# Patient Record
Sex: FEMALE | Employment: FULL TIME | URBAN - METROPOLITAN AREA
[De-identification: names, ages, dates, MRNs, and addresses within clinical notes are randomized per-mention and may not be internally consistent; named-entity substitution may affect disease eponyms.]

---

## 2017-01-03 ENCOUNTER — OFFICE VISIT (OUTPATIENT)
Dept: FAMILY MEDICINE CLINIC | Age: 62
End: 2017-01-03

## 2017-01-03 VITALS
OXYGEN SATURATION: 96 % | RESPIRATION RATE: 18 BRPM | HEART RATE: 64 BPM | WEIGHT: 178 LBS | TEMPERATURE: 95.8 F | SYSTOLIC BLOOD PRESSURE: 160 MMHG | HEIGHT: 59 IN | BODY MASS INDEX: 35.88 KG/M2 | DIASTOLIC BLOOD PRESSURE: 100 MMHG

## 2017-01-03 DIAGNOSIS — L84 CORN OF FOOT: ICD-10-CM

## 2017-01-03 DIAGNOSIS — I10 ESSENTIAL HYPERTENSION: Primary | ICD-10-CM

## 2017-01-03 RX ORDER — AMLODIPINE BESYLATE 5 MG/1
5 TABLET ORAL DAILY
Qty: 30 TAB | Refills: 0 | Status: SHIPPED | OUTPATIENT
Start: 2017-01-03 | End: 2017-03-27 | Stop reason: SINTOL

## 2017-01-03 NOTE — PROGRESS NOTES
Chief Complaint   Patient presents with    Follow Up Chronic Condition     hypertension    Foot Pain     pt states she thinks that she stepped on a rock, left foot

## 2017-01-03 NOTE — MR AVS SNAPSHOT
Visit Information Date & Time Provider Department Dept. Phone Encounter #  
 1/3/2017  9:30 AM Bette Casas NP [facility] [address] [phone] [id number] Follow-up Instructions Return in about 1 week (around 1/10/2017) for nurse BP check and 3 months for chronic disease follow up . Your Appointments 1/18/2017  8:30 AM  
COLON SCREEN with TSS [facility]IT [facility] Appt Note: NP CN Screen / Dr Bette Casas [address] [address] [address] [address]  
[facility] [address] [facility] [address] [address] [address] [address] Upcoming Health Maintenance Date Due DTaP/Tdap/Td series (1 - Tdap) 4/13/1978 COLONOSCOPY 7/8/2014 BREAST CANCER SCRN MAMMOGRAM 12/8/2018 Allergies as of 1/3/2017  Review Complete On: 1/3/2017 By: Bette Casas NP Severity Noted Reaction Type Reactions Latex High 04/10/2015    Itching Penicillins  04/10/2015    Unknown (comments) As a child Current Immunizations  Never Reviewed No immunizations on file. Not reviewed this visit You Were Diagnosed With   
  
 Codes Comments Essential hypertension    -  Primary ICD-10-CM: I10 
ICD-9-CM: 401.9 Corn of foot     ICD-10-CM: L84 
ICD-9-CM: 033 Vitals BP Pulse Temp Resp Height(growth percentile) Weight(growth percentile) 153/73 (BP 1 Location: Left arm, BP Patient Position: Sitting) 64 95.8 °F (35.4 °C) (Oral) 18 4' 11\" (1.499 m) 178 lb (80.7 kg) SpO2 BMI OB Status Smoking Status 96% 35.95 kg/m2 Hysterectomy Never Smoker Vitals History BMI and BSA Data Body Mass Index Body Surface Area 35.95 kg/m 2 1.83 m 2 Preferred Pharmacy Pharmacy Name Phone [address], [address] [address] [facility] [phone] Your Updated Medication List  
  
   
 This list is accurate as of: 1/3/17 10:20 AM.  Always use your most recent med list. amLODIPine 5 mg tablet Commonly known as:  Angeles Brandt Take 1 Tab by mouth daily. multivitamin tablet Commonly known as:  ONE A DAY Take 1 Tab by mouth daily. Prescriptions Sent to Pharmacy Refills  
 amLODIPine (NORVASC) 5 mg tablet 0 Sig: Take 1 Tab by mouth daily. Class: Normal  
 Pharmacy: [address] [place] [address] #: [phone] Route: Oral  
  
Follow-up Instructions Return in about 1 week (around 1/10/2017) for nurse BP check and 3 months for chronic disease follow up . Referral Information Referral ID Referred By Referred To  
  
 [id number] Charbel GARZA Not Available Visits Status Start Date End Date 1 New Request 1/3/17 1/3/18 If your referral has a status of pending review or denied, additional information will be sent to support the outcome of this decision. Introducing [facility] & HEALTH SERVICES! [facility] introduces [employer] patient portal. Now you can access parts of your medical record, email your doctor's office, and request medication refills online. 1. In your internet browser, go to [email or web address] 2. Click on the First Time User? Click Here link in the Sign In box. You will see the New Member Sign Up page. 3. Enter your [employer] Access Code exactly as it appears below. You will not need to use this code after youve completed the sign-up process. If you do not sign up before the expiration date, you must request a new code. · [employer] Access Code: [email or web address] Expires: 3/5/2017 10:42 AM 
 
4. Enter the last four digits of your Social Security Number (xxxx) and Date of Birth (mm/dd/yyyy) as indicated and click Submit. You will be taken to the next sign-up page. 5. Create a [employer] ID.  This will be your [employer] login ID and cannot be changed, so think of one that is secure and easy to remember. 6. Create a Digicompanion password. You can change your password at any time. 7. Enter your Password Reset Question and Answer. This can be used at a later time if you forget your password. 8. Enter your e-mail address. You will receive e-mail notification when new information is available in [address] 9. Click Sign Up. You can now view and download portions of your medical record. 10. Click the Download Summary menu link to download a portable copy of your medical information. If you have questions, please visit the Frequently Asked Questions section of the [employer] website. Remember, [employer] is NOT to be used for urgent needs. For medical emergencies, dial 911. Now available from your iPhone and Android! Please provide this summary of care documentation to your next provider. Your primary care clinician is listed as Nicole Dos Santos. If you have any questions after today's visit, please call [phone].

## 2017-01-03 NOTE — PROGRESS NOTES
HISTORY OF PRESENT ILLNESS  Amalia Soto is a 63 y.o. female. 1/3/2017  10:05 AM    Chief Complaint   Patient presents with    Follow Up Chronic Condition     hypertension    Foot Pain     pt states she thinks that she stepped on a rock, left foot       HPI: Here today for follow up of chronic disease- Last labwork done 2015- did not have labs done yet that were previously ordered Does not follow with any other providers. Hypertension: Does not check BP at home, used to be on Lisinopril but when she ran out, she did not think she needed it anymore- has been off for about a year. Denies any complaints of headaches, dizziness, cough, chest pain, SOB, or leg/facial swelling. Complains of left foot pain that has been happening for a few days, she reports that she thinks she stepped on a rock and had thin shoes on causing the pain. Denies any open areas or foreign bodies in foot. She reports pain comes with pressing on the area. Not taking or doing anything to help.        Review of Systems   Constitutional: Negative for chills, fever and malaise/fatigue. Eyes: Negative for double vision, photophobia and pain. Respiratory: Negative for cough and wheezing. Cardiovascular: Negative for palpitations and leg swelling. Gastrointestinal: Negative for constipation, diarrhea, nausea and vomiting. Genitourinary: Negative for dysuria, frequency and urgency. Musculoskeletal:        +left foot pain   Neurological: Negative for dizziness and weakness.         PHQ Screening   PHQ 2 / 9, over the last two weeks 4/10/2015   Little interest or pleasure in doing things Not at all   Feeling down, depressed or hopeless Not at all   Total Score PHQ 2 0         History  Past Medical History   Diagnosis Date    Hypertension        Past Surgical History   Procedure Laterality Date    Hx hysterectomy       fibroids    Hx  section  0    Hx  section         Social History     Social History    Marital status: UNKNOWN     Spouse name: N/A    Number of children: N/A    Years of education: N/A     Occupational History    Not on file. Social History Main Topics    Smoking status: Never Smoker    Smokeless tobacco: Never Used    Alcohol use No    Drug use: No    Sexual activity: Not Currently     Other Topics Concern    Not on file     Social History Narrative       Family History   Problem Relation Age of Onset    Cancer Father      lung- smoker    Lung Disease Father      smoker    Cancer Sister 45     breast    Breast Cancer Sister     Gout Brother        Allergies   Allergen Reactions    Latex Itching    Penicillins Unknown (comments)     As a child       Current Outpatient Prescriptions   Medication Sig Dispense Refill    multivitamin (ONE A DAY) tablet Take 1 Tab by mouth daily. Health Maintenance and Screenings  Colon Cancer: Repeat due in 2014- had polyp in 2009- referral placed for repeat previously  COPD Screen/ Lung Cancer: Not indicated- nonsmoker  CAD Screen: , HDL 60 5/2015, lifestyle changes, recheck ordered previously  Diabetes: Glucose 77 5/2015, recheck ordered  STD and HIV Screen: Declines- not high risk  Breast Cancer: Mammo done 12/2016- BIRADS 2  Cervical Cancer: Hysterectomy in past due to fibroids- not indicated  Osteoporosis Screen: Not indicated for early screening  Abdominal Aneurysm Screen: Not indicated  Opthalmology: Follows with eye doctor- seen last about one year ago  Dentistry Services: Follows with dentist  Hearing Impairment: No hearing loss noted  Skin Cancer Screen: Discussed self checks  Obesity Screen:  Body mass index is 35.95 kg/(m^2).     Flu Vaccination: Declines  Pneumococcal Vaccine: Not indicated for early vaccination  Shingles Vaccine: Not indicated for early vaccination  Tetanus Booster: Unsure of last booster- recommended  Hepatitis B Vaccinations: Not indicated- not high risk      Advance Care Planning:   Patient was offered the opportunity to discuss advance care planning NO   Does patient have an Advance Directive:  NO   If no, did you provide information on Caring Connections? Patient Care Team:  Patient Care Team:  Keenan Orellana NP as PCP - General        LABS:  None new to review    RADIOLOGY:  None new to review      Physical Exam   Constitutional: She is oriented to person, place, and time. She appears well-developed and well-nourished. No distress. Pulmonary/Chest: Effort normal. No respiratory distress. Musculoskeletal: She exhibits no edema. Left foot: There is tenderness. There is normal range of motion, no swelling, normal capillary refill, no deformity and no laceration. -left plantar side of foot with 4 mm x 4 mm callused, corn like area; no drainage and no erythema   Neurological: She is alert and oriented to person, place, and time. She exhibits normal muscle tone. Coordination normal.   Skin: Skin is warm and dry. Vitals:    01/03/17 0949   BP: 153/73   Pulse: 64   Resp: 18   Temp: 95.8 °F (35.4 °C)   TempSrc: Oral   SpO2: 96%   Weight: 178 lb (80.7 kg)   Height: 4' 11\" (1.499 m)   PainSc:   7   PainLoc: Foot     BP Readings from Last 3 Encounters:   01/03/17 153/73   12/02/16 160/90   07/07/15 139/77       ASSESSMENT and PLAN  Sandy Dawn was seen today for physical.    Diagnoses and all orders for this visit:      Essential hypertension  *Discussed with patient. Recommended pharmacological therapy to management HTN. Discussed long term effects of uncontrolled BP. She is hesitant to try but willing to do BP medication. Will start on Norvasc. Follow up in 1-2 weeks for nurse BP check. - amLODIPine (NORVASC) 5 mg tablet; Take 1 Tab by mouth daily. Dispense: 30 Tab; Refill: 0    Corn of foot  *Discussed. Appears to be a corn. Recommend referral to podiatry.   - [address] [place] [address] reviewed with patient.  Patient in agreement with plan and expresses understanding. All questions answered and patient encouraged to call or RTO if further questions or concerns. Follow-up Disposition:  Return in about 1 week (around 1/10/2017) for nurse BP check and 3 months for chronic disease follow up .

## 2017-01-18 ENCOUNTER — OFFICE VISIT (OUTPATIENT)
Dept: SURGERY | Age: 62
End: 2017-01-18

## 2017-01-18 VITALS
WEIGHT: 174.16 LBS | BODY MASS INDEX: 35.11 KG/M2 | HEIGHT: 59 IN | HEART RATE: 69 BPM | TEMPERATURE: 98.2 F | OXYGEN SATURATION: 99 % | RESPIRATION RATE: 16 BRPM

## 2017-01-18 DIAGNOSIS — Z12.11 COLON CANCER SCREENING: Primary | ICD-10-CM

## 2017-01-18 NOTE — PROGRESS NOTES
Review of Systems   Constitutional: Negative. HENT: Negative. Eyes: Positive for blurred vision. Negative for double vision, photophobia, pain, discharge and redness. Respiratory: Negative. Cardiovascular: Positive for palpitations. Negative for chest pain, orthopnea, claudication, leg swelling and PND. Gastrointestinal: Negative. Genitourinary: Negative. Musculoskeletal: Positive for neck pain. Negative for back pain, falls, joint pain and myalgias. Skin: Positive for itching and rash. Groin occas maybe due to detergent   Neurological: Negative. Endo/Heme/Allergies: Negative. Psychiatric/Behavioral: Negative. Colon Screen    Patient: Jalyn Pemberton MRN: [id number]  SSN: [id number]    YOB: 1955  Age: 63 y.o. Sex: female        Subjective:   Jalyn Pemberton was referred by her PCP, Oxana Mckeon NP. Patient referred for colonoscopy for   Screening colonoscopy. Patient denies rectal pain or bleeding. Abdominal surgeries as described below, specifically 2  sections and partial hysterectomy. Family history as described below, specifically sister with colon polyps. Last colonoscopy was 5 years ago at [facility], records release signed and faxed.     Allergies   Allergen Reactions    Latex Itching    Penicillins Unknown (comments)     As a child       Past Medical History   Diagnosis Date    Family hx colonic polyps      sister    Hypertension      Past Surgical History   Procedure Laterality Date    Hx hysterectomy       fibroids    Hx  section  0    Hx  section      Hx colonoscopy       normal      Family History   Problem Relation Age of Onset    Cancer Father      lung- smoker    Lung Disease Father      smoker    Cancer Sister 45     breast    Breast Cancer Sister     Gout Brother      Social History   Substance Use Topics    Smoking status: Never Smoker    Smokeless tobacco: Never Used    Alcohol use No      Prior to Admission medications    Medication Sig Start Date End Date Taking? Authorizing Provider   ibuprofen (ADVIL) 100 mg tablet Take 100 mg by mouth every six (6) hours as needed for Pain. Yes Historical Provider   multivitamin (ONE A DAY) tablet Take 1 Tab by mouth daily. Yes Historical Provider   amLODIPine (NORVASC) 5 mg tablet Take 1 Tab by mouth daily. 1/3/17   Marty Shukla NP          Review of Systems:      Risks colonoscopy described- colon injury, missed lesion, anesthesia problems, bleeding       Becky Colon, LPN  January 18, 6569  9:13 AM

## 2017-01-18 NOTE — MR AVS SNAPSHOT
Visit Information Date & Time Provider Department Dept. Phone Encounter #  
 1/18/2017  8:30 AM TSS HBV NURSE VISIT Tammy Ashutosh [facility] [phone] [id number] Upcoming Health Maintenance Date Due DTaP/Tdap/Td series (1 - Tdap) 4/13/1978 COLONOSCOPY 7/8/2014 BREAST CANCER SCRN MAMMOGRAM 12/8/2018 Allergies as of 1/18/2017  Review Complete On: 1/18/2017 By: Aj Lancaster. Early, LPN Severity Noted Reaction Type Reactions Latex High 04/10/2015    Itching Penicillins  04/10/2015    Unknown (comments) As a child Current Immunizations  Never Reviewed No immunizations on file. Not reviewed this visit Vitals Pulse Temp Resp Height(growth percentile) Weight(growth percentile) SpO2  
 69 98.2 °F (36.8 °C) (Oral) 16 4' 11\" (1.499 m) 174 lb 2.6 oz (79 kg) 99% BMI OB Status Smoking Status 35.18 kg/m2 Hysterectomy Never Smoker Vitals History BMI and BSA Data Body Mass Index Body Surface Area  
 35.18 kg/m 2 1.81 m 2 Preferred Pharmacy Pharmacy Name Phone [address], [address] [place] [address] [address] [facility] [phone] Your Updated Medication List  
  
   
This list is accurate as of: 1/18/17  9:12 AM.  Always use your most recent med list. ADVIL 100 mg tablet Generic drug:  ibuprofen Take 100 mg by mouth every six (6) hours as needed for Pain. amLODIPine 5 mg tablet Commonly known as:  Nadira Spruce Take 1 Tab by mouth daily. multivitamin tablet Commonly known as:  ONE A DAY Take 1 Tab by mouth daily. Introducing [facility] & HEALTH SERVICES! Tammy Boykin introduces [employer] patient portal. Now you can access parts of your medical record, email your doctor's office, and request medication refills online. 1. In your internet browser, go to [email or web address] 2. Click on the First Time User? Click Here link in the Sign In box. You will see the New Member Sign Up page. 3. Enter your [employer] Access Code exactly as it appears below. You will not need to use this code after youve completed the sign-up process. If you do not sign up before the expiration date, you must request a new code. · [employer] Access Code: [email or web address] Expires: 3/5/2017 10:42 AM 
 
4. Enter the last four digits of your Social Security Number (xxxx) and Date of Birth (mm/dd/yyyy) as indicated and click Submit. You will be taken to the next sign-up page. 5. Create a [employer] ID. This will be your [employer] login ID and cannot be changed, so think of one that is secure and easy to remember. 6. Create a [employer] password. You can change your password at any time. 7. Enter your Password Reset Question and Answer. This can be used at a later time if you forget your password. 8. Enter your e-mail address. You will receive e-mail notification when new information is available in [address] 9. Click Sign Up. You can now view and download portions of your medical record. 10. Click the Download Summary menu link to download a portable copy of your medical information. If you have questions, please visit the Frequently Asked Questions section of the [employer] website. Remember, [employer] is NOT to be used for urgent needs. For medical emergencies, dial 911. Now available from your iPhone and Android! Please provide this summary of care documentation to your next provider. Your primary care clinician is listed as Sun Kay. If you have any questions after today's visit, please call [phone].

## 2017-03-27 ENCOUNTER — OFFICE VISIT (OUTPATIENT)
Dept: FAMILY MEDICINE CLINIC | Age: 62
End: 2017-03-27

## 2017-03-27 VITALS
SYSTOLIC BLOOD PRESSURE: 169 MMHG | TEMPERATURE: 98.3 F | BODY MASS INDEX: 35.92 KG/M2 | WEIGHT: 178.2 LBS | HEIGHT: 59 IN | OXYGEN SATURATION: 98 % | RESPIRATION RATE: 18 BRPM | DIASTOLIC BLOOD PRESSURE: 89 MMHG | HEART RATE: 69 BPM

## 2017-03-27 DIAGNOSIS — R21 RASH: Primary | ICD-10-CM

## 2017-03-27 DIAGNOSIS — I10 ESSENTIAL HYPERTENSION: ICD-10-CM

## 2017-03-27 RX ORDER — TRIAMCINOLONE ACETONIDE 5 MG/G
CREAM TOPICAL 2 TIMES DAILY
Qty: 15 G | Refills: 0 | Status: SHIPPED | OUTPATIENT
Start: 2017-03-27 | End: 2017-06-14

## 2017-03-27 RX ORDER — LISINOPRIL 10 MG/1
10 TABLET ORAL DAILY
Qty: 30 TAB | Refills: 0 | Status: SHIPPED | OUTPATIENT
Start: 2017-03-27 | End: 2017-06-14 | Stop reason: SDUPTHER

## 2017-03-27 NOTE — MR AVS SNAPSHOT
Visit Information Date & Time Provider Department Dept. Phone Encounter #  
 3/27/2017  9:15 AM Silvana Mcmullen, [address] [facility] [address] [id number] Your Appointments 4/25/2017  9:45 AM  
FOLLOW UP EXAM with Danisha Jimenez NP [facility] ([facility]) Appt Note: 2 week f/u  
 [address] [facility] [address]  
[facility] [address] Upcoming Health Maintenance Date Due DTaP/Tdap/Td series (1 - Tdap) 4/13/1978 COLONOSCOPY 7/8/2014 BREAST CANCER SCRN MAMMOGRAM 12/8/2018 Allergies as of 3/27/2017  Review Complete On: 3/27/2017 By: CAROLINA Rios Severity Noted Reaction Type Reactions Latex High 04/10/2015    Itching Penicillins  04/10/2015    Unknown (comments) As a child Current Immunizations  Never Reviewed No immunizations on file. Not reviewed this visit You Were Diagnosed With   
  
 Codes Comments Rash    -  Primary ICD-10-CM: R21 
ICD-9-CM: 782.1 Essential hypertension     ICD-10-CM: I10 
ICD-9-CM: 401.9 Vitals BP Pulse Temp Resp Height(growth percentile) Weight(growth percentile) 169/89 (BP 1 Location: Right arm, BP Patient Position: Sitting) 69 98.3 °F (36.8 °C) (Oral) 18 4' 11\" (1.499 m) 178 lb 3.2 oz (80.8 kg) SpO2 BMI OB Status Smoking Status 98% 35.99 kg/m2 Hysterectomy Never Smoker Vitals History BMI and BSA Data Body Mass Index Body Surface Area 35.99 kg/m 2 1.83 m 2 Preferred Pharmacy Pharmacy Name Phone [address], [address] [facility] 22 [address] [facility] [phone] Your Updated Medication List  
  
   
This list is accurate as of: 3/27/17  9:42 AM.  Always use your most recent med list. ADVIL 100 mg tablet Generic drug:  ibuprofen Take 100 mg by mouth every six (6) hours as needed for Pain. amLODIPine 5 mg tablet Commonly known as:  Rudene Post Take 1 Tab by mouth daily. lisinopril 10 mg tablet Commonly known as:  Delsie Commander Take 1 Tab by mouth daily. multivitamin tablet Commonly known as:  ONE A DAY Take 1 Tab by mouth daily. triamcinolone 0.5 % topical cream  
Commonly known as:  ARISTOCORT Apply  to affected area two (2) times a day. use thin layer Prescriptions Sent to Pharmacy Refills  
 triamcinolone (ARISTOCORT) 0.5 % topical cream 0 Sig: Apply  to affected area two (2) times a day. use thin layer Class: Normal  
 Pharmacy: [address] [place] [address] Ph #: [phone] Route: Topical  
 lisinopril (PRINIVIL, ZESTRIL) 10 mg tablet 0 Sig: Take 1 Tab by mouth daily. Class: Normal  
 Pharmacy: [address] [place] [address] Ph #: [phone] Route: Oral  
  
Introducing [facility] & HEALTH SERVICES! [facility] introduces [employer] patient portal. Now you can access parts of your medical record, email your doctor's office, and request medication refills online. 1. In your internet browser, go to [email or web address] 2. Click on the First Time User? Click Here link in the Sign In box. You will see the New Member Sign Up page. 3. Enter your Lala Access Code exactly as it appears below. You will not need to use this code after youve completed the sign-up process. If you do not sign up before the expiration date, you must request a new code. · [employer]ala Access Code: [email or web address] Expires: 6/25/2017  9:42 AM 
 
4. Enter the last four digits of your Social Security Number (xxxx) and Date of Birth (mm/dd/yyyy) as indicated and click Submit. You will be taken to the next sign-up page. 5. Create a [employer] ID. This will be your [employer] login ID and cannot be changed, so think of one that is secure and easy to remember. 6. Create a [employer] password. You can change your password at any time. 7. Enter your Password Reset Question and Answer. This can be used at a later time if you forget your password. 8. Enter your e-mail address. You will receive e-mail notification when new information is available in [address] 9. Click Sign Up. You can now view and download portions of your medical record. 10. Click the Download Summary menu link to download a portable copy of your medical information. If you have questions, please visit the Frequently Asked Questions section of the EnergyDe[employer] website. Remember, [employer] is NOT to be used for urgent needs. For medical emergencies, dial 911. Now available from your iPhone and Android! Please provide this summary of care documentation to your next provider. Your primary care clinician is listed as Juany Davis. If you have any questions after today's visit, please call [phone].

## 2017-03-27 NOTE — PROGRESS NOTES
Patient: Claudia Hollingsworth MRN: [id number]  SSN: [id number]    YOB: 1955  Age: 63 y.o. Sex: female      Date of Service: 3/27/2017   Provider: CAROLINA Frederick   Office Location:   [address] [facility], [address] [place] [address], [address]  Office Phone: [phone]  Office Fax: [phone]        REASON FOR VISIT:   Chief Complaint   Patient presents with    Medication Reaction     amlodipine, red raised rash on chest        VITALS:   Visit Vitals    /89 (BP 1 Location: Right arm, BP Patient Position: Sitting)    Pulse 69    Temp 98.3 °F (36.8 °C) (Oral)    Resp 18    Ht 4' 11\" (1.499 m)    Wt 178 lb 3.2 oz (80.8 kg)    SpO2 98%    BMI 35.99 kg/m2       MEDICATIONS:   Current Outpatient Prescriptions   Medication Sig Dispense Refill    triamcinolone (ARISTOCORT) 0.5 % topical cream Apply  to affected area two (2) times a day. use thin layer 15 g 0    lisinopril (PRINIVIL, ZESTRIL) 10 mg tablet Take 1 Tab by mouth daily. 30 Tab 0    ibuprofen (ADVIL) 100 mg tablet Take 100 mg by mouth every six (6) hours as needed for Pain.  multivitamin (ONE A DAY) tablet Take 1 Tab by mouth daily.  amLODIPine (NORVASC) 5 mg tablet Take 1 Tab by mouth daily.  30 Tab 0        ALLERGIES:   Allergies   Allergen Reactions    Latex Itching    Penicillins Unknown (comments)     As a child        ACTIVE MEDICAL PROBLEMS:  Patient Active Problem List   Diagnosis Code    Essential hypertension I10        MEDICAL/SURGICAL HISTORY:  Past Medical History:   Diagnosis Date    Family hx colonic polyps     sister    Hypertension       Past Surgical History:   Procedure Laterality Date    HX  SECTION  0    HX  SECTION      HX COLONOSCOPY  2012    normal    HX HYSTERECTOMY  1997    fibroids        FAMILY HISTORY:  Family History   Problem Relation Age of Onset    Cancer Father      lung- smoker    Lung Disease Father      smoker    Cancer Sister 45     breast    Breast Cancer Sister     Gout Brother         SOCIAL HISTORY:  Social History   Substance Use Topics    Smoking status: Never Smoker    Smokeless tobacco: Never Used    Alcohol use No            HISTORY OF PRESENT ILLNESS:   Radha Nam is a 63 y.o. female who presents to the office complaining of a red rash on her chest x 3 days. Patient reports she was prescribed amlodipine a few months ago, but only started the medication on Friday night 3/24. The following morning, Saturday 3/25, patient woke up with some itching on her chest and anterior R shoulder. Later in the day, she looked at her skin, and noticed a \"red bumpy\" rash on her chest. She applied some alcohol and neosporin to the rash with no relief. Then tried a dose of Benadryl, which seems to have reduced the redness somewhat. However, pruritic rash still persists. She has not taken any more of the amlodipine, as she presumes this rash was an allergic reaction to the medication. She reports feeling well otherwise. No fevers, chills, swelling of lips or throat. Cannot think of any other new exposures to new foods, pets, skin products, laundry detergents. Patient denies any contacts with similar rash. REVIEW OF SYSTEMS:  Review of Systems   Constitutional: Negative for chills and fever. Respiratory: Negative for cough, shortness of breath and wheezing. Cardiovascular: Negative for chest pain and palpitations. Gastrointestinal: Negative for abdominal pain, diarrhea, nausea and vomiting. Skin: Positive for itching and rash. PHYSICAL EXAMINATION:  Physical Exam   Constitutional: She is well-developed, well-nourished, and in no distress. Cardiovascular: Normal rate, regular rhythm and normal heart sounds. Exam reveals no gallop and no friction rub. No murmur heard. Skin: Skin is warm and dry.  Rash ( erythematous wheals with evidence of excoriation to chest wall above breasts and R anterior shoulder) noted.        RESULTS:  No results found for this visit on 03/27/17. ASSESSMENT/PLAN:  Codey Bates was seen today for medication reaction. Diagnoses and all orders for this visit:    Rash  - Etiology unclear, possible allergic reaction to amlodipine, though discussed with patient that because she has discontinued the medication, I would have expected the rash to improve by now  - Encouraged continued use of antihistamines and start topical steroid as below  - Will update allergy list for now, but still think it may be reasonable to try resuming amlodipine at some point if necessary  -     triamcinolone (ARISTOCORT) 0.5 % topical cream; Apply  to affected area two (2) times a day. use thin layer    Essential hypertension  - BP still elevated, reviewed need for antihypertensive therapy with patient  - Will start lisinopril as below  - Advised to schedule follow up with PCP Agnes Packer in about 2 weeks   -     lisinopril (PRINIVIL, ZESTRIL) 10 mg tablet; Take 1 Tab by mouth daily. Follow up 2 weeks  sooner PRN if symptoms worsen or fail to resolve    Patient expresses understanding and is agreeable with the above plan.       PATIENT CARE TEAM:   Patient Care Team:  Agnes Packer NP as PCP - [facility], [address]   March 27, 2017    9:47 AM

## 2017-06-14 ENCOUNTER — OFFICE VISIT (OUTPATIENT)
Dept: FAMILY MEDICINE CLINIC | Age: 62
End: 2017-06-14

## 2017-06-14 VITALS
WEIGHT: 167.4 LBS | SYSTOLIC BLOOD PRESSURE: 101 MMHG | OXYGEN SATURATION: 98 % | BODY MASS INDEX: 33.75 KG/M2 | DIASTOLIC BLOOD PRESSURE: 57 MMHG | HEART RATE: 90 BPM | TEMPERATURE: 96.8 F | RESPIRATION RATE: 18 BRPM | HEIGHT: 59 IN

## 2017-06-14 DIAGNOSIS — Z98.890 S/P CRANIOTOMY: ICD-10-CM

## 2017-06-14 DIAGNOSIS — Z93.1 S/P PERCUTANEOUS ENDOSCOPIC GASTROSTOMY (PEG) TUBE PLACEMENT (HCC): ICD-10-CM

## 2017-06-14 DIAGNOSIS — Z98.890 H/O TRACHEOSTOMY: ICD-10-CM

## 2017-06-14 DIAGNOSIS — I60.9 SAH (SUBARACHNOID HEMORRHAGE) (HCC): ICD-10-CM

## 2017-06-14 DIAGNOSIS — S02.91XD CLOSED FRACTURE OF SKULL WITH ROUTINE HEALING, UNSPECIFIED BONE, SUBSEQUENT ENCOUNTER: ICD-10-CM

## 2017-06-14 DIAGNOSIS — S06.5XAA SDH (SUBDURAL HEMATOMA): ICD-10-CM

## 2017-06-14 DIAGNOSIS — I10 ESSENTIAL HYPERTENSION: ICD-10-CM

## 2017-06-14 DIAGNOSIS — S06.9X9D TBI (TRAUMATIC BRAIN INJURY), WITH LOC OF UNSPECIFIED DURATION, SUBSEQUENT ENCOUNTER: Primary | ICD-10-CM

## 2017-06-14 DIAGNOSIS — Z29.9 PROPHYLACTIC MEASURE: ICD-10-CM

## 2017-06-14 DIAGNOSIS — S02.92XD CLOSED FRACTURE OF FACIAL BONE WITH ROUTINE HEALING, UNSPECIFIED FACIAL BONE, SUBSEQUENT ENCOUNTER: ICD-10-CM

## 2017-06-14 RX ORDER — LISINOPRIL 20 MG/1
20 TABLET ORAL DAILY
Qty: 30 TAB | Refills: 2 | Status: SHIPPED | OUTPATIENT
Start: 2017-06-14 | End: 2018-11-16 | Stop reason: SDDI

## 2017-06-14 RX ORDER — AMLODIPINE BESYLATE 10 MG/1
10 TABLET ORAL DAILY
Qty: 30 TAB | Refills: 2 | Status: SHIPPED | OUTPATIENT
Start: 2017-06-14 | End: 2018-11-16

## 2017-06-14 RX ORDER — FAMOTIDINE 20 MG/1
20 TABLET, FILM COATED ORAL 2 TIMES DAILY
Qty: 60 TAB | Refills: 2 | Status: SHIPPED | OUTPATIENT
Start: 2017-06-14 | End: 2017-06-22 | Stop reason: CLARIF

## 2017-06-14 NOTE — MR AVS SNAPSHOT
Visit Information Date & Time Provider Department Dept. Phone Encounter #  
 6/14/2017  1:30 PM Wendy Grimaldo [facility] [phone] [id number] Follow-up Instructions Return in about 3 months (around 8/30/2017). Your Appointments 8/30/2017  1:45 PM  
FOLLOW UP EXAM with Wendy Grimaldo NP [facility] ([facility]) Appt Note: 2 month f/u 30 min [address] [facility] [id number]  
[facility] [id number] Upcoming Health Maintenance Date Due DTaP/Tdap/Td series (1 - Tdap) 4/13/1978 COLONOSCOPY 7/8/2014 ZOSTER VACCINE AGE 60> 4/13/2017 INFLUENZA AGE 9 TO ADULT 8/1/2017 BREAST CANCER SCRN MAMMOGRAM 12/8/2018 Allergies as of 6/14/2017  Review Complete On: 6/14/2017 By: Wendy Grimaldo NP Severity Noted Reaction Type Reactions Latex High 04/10/2015    Itching Penicillins  04/10/2015    Unknown (comments) As a child Current Immunizations  Never Reviewed No immunizations on file. Not reviewed this visit You Were Diagnosed With   
  
 Codes Comments TBI (traumatic brain injury), with LOC of unspecified duration, subsequent encounter    -  Primary ICD-10-CM: A95.7U0E 
ICD-9-CM: V58.89 Essential hypertension     ICD-10-CM: I10 
ICD-9-CM: 401.9 Vitals BP Pulse Temp Resp Height(growth percentile) Weight(growth percentile) 101/57 (BP 1 Location: Right arm, BP Patient Position: Sitting) 90 96.8 °F (36 °C) (Oral) 18 4' 11\" (1.499 m) 167 lb 6.4 oz (75.9 kg) SpO2 BMI OB Status Smoking Status 98% 33.81 kg/m2 Hysterectomy Never Smoker Vitals History BMI and BSA Data Body Mass Index Body Surface Area  
 33.81 kg/m 2 1.78 m 2 Preferred Pharmacy Pharmacy Name Phone [address], [address] [facility] [phone] Your Updated Medication List  
  
   
This list is accurate as of: 6/14/17  2:29 PM.  Always use your most recent med list. amLODIPine 10 mg tablet Commonly known as:  Deneen Jaksch Take 1 Tab by mouth daily. famotidine 20 mg tablet Commonly known as:  PEPCID Take 1 Tab by mouth two (2) times a day. lisinopril 20 mg tablet Commonly known as:  Ernie Leaver Take 1 Tab by mouth daily. multivitamin tablet Commonly known as:  ONE A DAY Take 1 Tab by mouth daily. Prescriptions Sent to Pharmacy Refills  
 lisinopril (PRINIVIL, ZESTRIL) 20 mg tablet 2 Sig: Take 1 Tab by mouth daily. Class: Normal  
 Pharmacy: [address] [place] [address] Ph #: [phone] Route: Oral  
 amLODIPine (NORVASC) 10 mg tablet 2 Sig: Take 1 Tab by mouth daily. Class: Normal  
 Pharmacy: [address] [place] [address] Ph #: [phone] Route: Oral  
 famotidine (PEPCID) 20 mg tablet 2 Sig: Take 1 Tab by mouth two (2) times a day. Class: Normal  
 Pharmacy: [address] [place] [address] Ph #: [phone] Route: Oral  
  
Follow-up Instructions Return in about 3 months (around 8/30/2017). Introducing [facility]! [employer] introduces [employer] patient portal. Now you can access parts of your medical record, email your doctor's office, and request medication refills online. 1. In your internet browser, go to https://Tweet Category. [employer]/Tweet Category 2. Click on the First Time User? Click Here link in the Sign In box. You will see the New Member Sign Up page. 3. Enter your [employer] Access Code exactly as it appears below. You will not need to use this code after youve completed the sign-up process.  If you do not sign up before the expiration date, you must request a new code. · [employer] Access Code: [email or web address] Expires: 6/25/2017  9:42 AM 
 
4. Enter the last four digits of your Social Security Number (xxxx) and Date of Birth (mm/dd/yyyy) as indicated and click Submit. You will be taken to the next sign-up page. 5. Create a [employer] ID. This will be your [employer] login ID and cannot be changed, so think of one that is secure and easy to remember. 6. Create a [employer] password. You can change your password at any time. 7. Enter your Password Reset Question and Answer. This can be used at a later time if you forget your password. 8. Enter your e-mail address. You will receive e-mail notification when new information is available in [address] 9. Click Sign Up. You can now view and download portions of your medical record. 10. Click the Download Summary menu link to download a portable copy of your medical information. If you have questions, please visit the Frequently Asked Questions section of the [employer] website. Remember, [employer] is NOT to be used for urgent needs. For medical emergencies, dial 911. Now available from your iPhone and Android! Please provide this summary of care documentation to your next provider. Your primary care clinician is listed as Svitlana Ochoa. If you have any questions after today's visit, please call [phone].
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
## 2017-06-16 ENCOUNTER — TELEPHONE (OUTPATIENT)
Dept: FAMILY MEDICINE CLINIC | Age: 62
End: 2017-06-16

## 2017-06-16 PROBLEM — Z98.890 S/P CRANIOTOMY: Status: ACTIVE | Noted: 2017-06-16

## 2017-06-16 PROBLEM — I60.9 SAH (SUBARACHNOID HEMORRHAGE) (HCC): Status: ACTIVE | Noted: 2017-06-16

## 2017-06-16 PROBLEM — Z93.1 S/P PERCUTANEOUS ENDOSCOPIC GASTROSTOMY (PEG) TUBE PLACEMENT (HCC): Status: ACTIVE | Noted: 2017-06-16

## 2017-06-16 PROBLEM — S06.5XAA SDH (SUBDURAL HEMATOMA): Status: RESOLVED | Noted: 2017-06-16 | Resolved: 2017-06-16

## 2017-06-16 PROBLEM — I60.9 SAH (SUBARACHNOID HEMORRHAGE) (HCC): Status: RESOLVED | Noted: 2017-06-16 | Resolved: 2017-06-16

## 2017-06-16 PROBLEM — S06.5XAA SDH (SUBDURAL HEMATOMA): Status: ACTIVE | Noted: 2017-06-16

## 2017-06-16 PROBLEM — S06.9XAA TBI (TRAUMATIC BRAIN INJURY): Status: ACTIVE | Noted: 2017-06-16

## 2017-06-16 PROBLEM — S02.91XD CLOSED FRACTURE OF SKULL WITH ROUTINE HEALING: Status: ACTIVE | Noted: 2017-06-16

## 2017-06-16 PROBLEM — S02.92XD CLOSED FRACTURE OF FACIAL BONE WITH ROUTINE HEALING: Status: ACTIVE | Noted: 2017-06-16

## 2017-06-16 NOTE — TELEPHONE ENCOUNTER
6/16/2017  10:27 AM    Chief Complaint   Patient presents with    Other         Bed alarm not covered under health insurance. Called and informed daughter that this will have to be bought outright through a medical supply store. Given contact information for [facility] in [place] if interested in pursuing.

## 2017-06-22 ENCOUNTER — TELEPHONE (OUTPATIENT)
Dept: FAMILY MEDICINE CLINIC | Age: 62
End: 2017-06-22

## 2017-06-22 RX ORDER — RANITIDINE 150 MG/1
150 TABLET, FILM COATED ORAL 2 TIMES DAILY
Qty: 60 TAB | Refills: 2 | Status: SHIPPED | OUTPATIENT
Start: 2017-06-22 | End: 2018-11-16

## 2017-06-22 NOTE — TELEPHONE ENCOUNTER
6/22/2017  8:37 AM    Chief Complaint   Patient presents with    Prior Auth       Noted fax from pharmacy stating that plan does not cover Pepcid. Replace with Zantac. Sent in eRx.

## 2017-07-06 ENCOUNTER — DOCUMENTATION ONLY (OUTPATIENT)
Dept: FAMILY MEDICINE CLINIC | Age: 62
End: 2017-07-06

## 2017-07-06 NOTE — PROGRESS NOTES
7/6/2017  5:35 PM    Chief Complaint   Patient presents with    Other       Noted Medical Evaluation Report dropped off for patient. [address] requesting information to consider appointment of guardian and/or conservator. Spoke with daughter- form available for . She will be picking up tomorrow.

## 2018-11-16 ENCOUNTER — OFFICE VISIT (OUTPATIENT)
Dept: FAMILY MEDICINE CLINIC | Age: 63
End: 2018-11-16

## 2018-11-16 VITALS
RESPIRATION RATE: 16 BRPM | TEMPERATURE: 99.4 F | SYSTOLIC BLOOD PRESSURE: 142 MMHG | WEIGHT: 184 LBS | BODY MASS INDEX: 37.09 KG/M2 | HEIGHT: 59 IN | HEART RATE: 80 BPM | DIASTOLIC BLOOD PRESSURE: 71 MMHG | OXYGEN SATURATION: 96 %

## 2018-11-16 DIAGNOSIS — Z13.0 SCREENING FOR ENDOCRINE, METABOLIC AND IMMUNITY DISORDER: ICD-10-CM

## 2018-11-16 DIAGNOSIS — Z13.6 ENCOUNTER FOR LIPID SCREENING FOR CARDIOVASCULAR DISEASE: ICD-10-CM

## 2018-11-16 DIAGNOSIS — R05.9 COUGH: ICD-10-CM

## 2018-11-16 DIAGNOSIS — E66.01 SEVERE OBESITY (HCC): ICD-10-CM

## 2018-11-16 DIAGNOSIS — Z13.220 ENCOUNTER FOR LIPID SCREENING FOR CARDIOVASCULAR DISEASE: ICD-10-CM

## 2018-11-16 DIAGNOSIS — Z98.890 S/P CRANIOTOMY: ICD-10-CM

## 2018-11-16 DIAGNOSIS — N39.46 MIXED STRESS AND URGE URINARY INCONTINENCE: ICD-10-CM

## 2018-11-16 DIAGNOSIS — I10 ESSENTIAL HYPERTENSION: Primary | ICD-10-CM

## 2018-11-16 DIAGNOSIS — Z13.29 SCREENING FOR ENDOCRINE, METABOLIC AND IMMUNITY DISORDER: ICD-10-CM

## 2018-11-16 DIAGNOSIS — Z13.228 SCREENING FOR ENDOCRINE, METABOLIC AND IMMUNITY DISORDER: ICD-10-CM

## 2018-11-16 DIAGNOSIS — Z23 ENCOUNTER FOR IMMUNIZATION: ICD-10-CM

## 2018-11-16 DIAGNOSIS — S06.9X9S TRAUMATIC BRAIN INJURY WITH LOSS OF CONSCIOUSNESS, SEQUELA (HCC): ICD-10-CM

## 2018-11-16 PROBLEM — Z93.1 S/P PERCUTANEOUS ENDOSCOPIC GASTROSTOMY (PEG) TUBE PLACEMENT (HCC): Status: RESOLVED | Noted: 2017-06-16 | Resolved: 2018-11-16

## 2018-11-16 PROBLEM — S02.92XD CLOSED FRACTURE OF FACIAL BONE WITH ROUTINE HEALING: Status: RESOLVED | Noted: 2017-06-16 | Resolved: 2018-11-16

## 2018-11-16 PROBLEM — S02.91XD CLOSED FRACTURE OF SKULL WITH ROUTINE HEALING: Status: RESOLVED | Noted: 2017-06-16 | Resolved: 2018-11-16

## 2018-11-16 RX ORDER — FAMOTIDINE 20 MG/1
20 TABLET, FILM COATED ORAL 2 TIMES DAILY
COMMUNITY
End: 2018-11-16

## 2018-11-16 RX ORDER — ACETAMINOPHEN 325 MG/1
325 TABLET ORAL
COMMUNITY

## 2018-11-16 RX ORDER — FAMOTIDINE 40 MG/1
40 TABLET, FILM COATED ORAL 2 TIMES DAILY
Qty: 60 TAB | Refills: 0 | Status: SHIPPED | OUTPATIENT
Start: 2018-11-16 | End: 2018-12-14 | Stop reason: SDUPTHER

## 2018-11-16 RX ORDER — AMLODIPINE BESYLATE 2.5 MG/1
2.5 TABLET ORAL DAILY
Qty: 30 TAB | Refills: 1 | Status: SHIPPED | OUTPATIENT
Start: 2018-11-16 | End: 2018-12-14 | Stop reason: SDUPTHER

## 2018-11-16 RX ORDER — CETIRIZINE HCL 10 MG
10 TABLET ORAL DAILY
Qty: 30 TAB | Refills: 0 | Status: SHIPPED | OUTPATIENT
Start: 2018-11-16 | End: 2018-12-14 | Stop reason: SDUPTHER
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
## 2018-11-16 NOTE — PROGRESS NOTES
Gilberto Kohli is a 66 y.o. female who presents for routine immunizations. She denies any symptoms , reactions or allergies that would exclude them from being immunized today. Risks and adverse reactions were discussed and the VIS was given to them. All questions were addressed. She was observed for 15 min post injection. There were no reactions observed.  
 
Marissa Tillman LPN

## 2018-11-17 DIAGNOSIS — Z13.228 SCREENING FOR ENDOCRINE, METABOLIC AND IMMUNITY DISORDER: ICD-10-CM

## 2018-11-17 DIAGNOSIS — Z13.0 SCREENING FOR ENDOCRINE, METABOLIC AND IMMUNITY DISORDER: ICD-10-CM

## 2018-11-17 DIAGNOSIS — I10 ESSENTIAL HYPERTENSION: ICD-10-CM

## 2018-11-17 DIAGNOSIS — Z13.220 ENCOUNTER FOR LIPID SCREENING FOR CARDIOVASCULAR DISEASE: ICD-10-CM

## 2018-11-17 DIAGNOSIS — Z13.6 ENCOUNTER FOR LIPID SCREENING FOR CARDIOVASCULAR DISEASE: ICD-10-CM

## 2018-11-17 DIAGNOSIS — Z13.29 SCREENING FOR ENDOCRINE, METABOLIC AND IMMUNITY DISORDER: ICD-10-CM

## 2018-11-30 ENCOUNTER — HOSPITAL ENCOUNTER (OUTPATIENT)
Dept: LAB | Age: 63
Discharge: HOME | End: 2018-11-30
Payer: SELF-PAY

## 2018-11-30 LAB
ALBUMIN SERPL-MCNC: 3.7 G/DL (ref 3.4–5)
ALBUMIN/GLOB SERPL: 0.9 {RATIO} (ref 0.8–1.7)
ALP SERPL-CCNC: 104 U/L (ref 45–117)
ALT SERPL-CCNC: 37 U/L (ref 13–56)
ANION GAP SERPL CALC-SCNC: 1 MMOL/L (ref 3–18)
APPEARANCE UR: CLEAR
AST SERPL-CCNC: 19 U/L (ref 15–37)
BACTERIA URNS QL MICRO: ABNORMAL /HPF
BILIRUB SERPL-MCNC: 0.2 MG/DL (ref 0.2–1)
BILIRUB UR QL: NEGATIVE
BUN SERPL-MCNC: 11 MG/DL (ref 7–18)
BUN/CREAT SERPL: 15 (ref 12–20)
CALCIUM SERPL-MCNC: 8.7 MG/DL (ref 8.5–10.1)
CHLORIDE SERPL-SCNC: 105 MMOL/L (ref 100–108)
CHOLEST SERPL-MCNC: 274 MG/DL
CO2 SERPL-SCNC: 35 MMOL/L (ref 21–32)
COLOR UR: YELLOW
CREAT SERPL-MCNC: 0.71 MG/DL (ref 0.6–1.3)
EPITH CASTS URNS QL MICRO: ABNORMAL /LPF (ref 0–5)
ERYTHROCYTE [DISTWIDTH] IN BLOOD BY AUTOMATED COUNT: 14.4 % (ref 11.6–14.5)
GLOBULIN SER CALC-MCNC: 4.1 G/DL (ref 2–4)
GLUCOSE SERPL-MCNC: 78 MG/DL (ref 74–99)
GLUCOSE UR STRIP.AUTO-MCNC: NEGATIVE MG/DL
HCT VFR BLD AUTO: 37.7 % (ref 35–45)
HDLC SERPL-MCNC: 63 MG/DL (ref 40–60)
HDLC SERPL: 4.3 {RATIO} (ref 0–5)
HGB BLD-MCNC: 11.8 G/DL (ref 12–16)
HGB UR QL STRIP: NEGATIVE
KETONES UR QL STRIP.AUTO: NEGATIVE MG/DL
LDLC SERPL CALC-MCNC: 172.6 MG/DL (ref 0–100)
LEUKOCYTE ESTERASE UR QL STRIP.AUTO: ABNORMAL
LIPID PROFILE,FLP: ABNORMAL
MCH RBC QN AUTO: 27.3 PG (ref 24–34)
MCHC RBC AUTO-ENTMCNC: 31.3 G/DL (ref 31–37)
MCV RBC AUTO: 87.1 FL (ref 74–97)
MUCOUS THREADS URNS QL MICRO: ABNORMAL /LPF
NITRITE UR QL STRIP.AUTO: NEGATIVE
PH UR STRIP: 7 [PH] (ref 5–8)
PLATELET # BLD AUTO: 225 K/UL (ref 135–420)
PMV BLD AUTO: 11.3 FL (ref 9.2–11.8)
POTASSIUM SERPL-SCNC: 3.8 MMOL/L (ref 3.5–5.5)
PROT SERPL-MCNC: 7.8 G/DL (ref 6.4–8.2)
PROT UR STRIP-MCNC: NEGATIVE MG/DL
RBC # BLD AUTO: 4.33 M/UL (ref 4.2–5.3)
RBC #/AREA URNS HPF: NEGATIVE /HPF (ref 0–5)
SODIUM SERPL-SCNC: 141 MMOL/L (ref 136–145)
SP GR UR REFRACTOMETRY: 1.02 (ref 1–1.03)
TRIGL SERPL-MCNC: 192 MG/DL (ref ?–150)
TSH SERPL DL<=0.05 MIU/L-ACNC: 0.64 UIU/ML (ref 0.36–3.74)
UROBILINOGEN UR QL STRIP.AUTO: 1 EU/DL (ref 0.2–1)
VLDLC SERPL CALC-MCNC: 38.4 MG/DL
WBC # BLD AUTO: 6.6 K/UL (ref 4.6–13.2)
WBC URNS QL MICRO: ABNORMAL /HPF (ref 0–5)

## 2018-11-30 PROCEDURE — 81001 URINALYSIS AUTO W/SCOPE: CPT

## 2018-11-30 PROCEDURE — 84443 ASSAY THYROID STIM HORMONE: CPT

## 2018-11-30 PROCEDURE — 80053 COMPREHEN METABOLIC PANEL: CPT

## 2018-11-30 PROCEDURE — 36415 COLL VENOUS BLD VENIPUNCTURE: CPT

## 2018-11-30 PROCEDURE — 80061 LIPID PANEL: CPT

## 2018-11-30 PROCEDURE — 85027 COMPLETE CBC AUTOMATED: CPT

## 2018-12-14 ENCOUNTER — OFFICE VISIT (OUTPATIENT)
Dept: FAMILY MEDICINE CLINIC | Age: 63
End: 2018-12-14

## 2018-12-14 VITALS
OXYGEN SATURATION: 100 % | TEMPERATURE: 97.9 F | DIASTOLIC BLOOD PRESSURE: 85 MMHG | SYSTOLIC BLOOD PRESSURE: 134 MMHG | BODY MASS INDEX: 38.3 KG/M2 | HEIGHT: 59 IN | RESPIRATION RATE: 16 BRPM | HEART RATE: 75 BPM | WEIGHT: 190 LBS

## 2018-12-14 DIAGNOSIS — E78.2 MIXED HYPERLIPIDEMIA: ICD-10-CM

## 2018-12-14 DIAGNOSIS — S06.9X9S TRAUMATIC BRAIN INJURY WITH LOSS OF CONSCIOUSNESS, SEQUELA (HCC): ICD-10-CM

## 2018-12-14 DIAGNOSIS — I10 ESSENTIAL HYPERTENSION: Primary | ICD-10-CM

## 2018-12-14 DIAGNOSIS — Z00.00 HEALTHCARE MAINTENANCE: ICD-10-CM

## 2018-12-14 DIAGNOSIS — R05.9 COUGH: ICD-10-CM

## 2018-12-14 RX ORDER — FAMOTIDINE 40 MG/1
40 TABLET, FILM COATED ORAL 2 TIMES DAILY
Qty: 90 TAB | Refills: 3 | Status: SHIPPED | OUTPATIENT
Start: 2018-12-14

## 2018-12-14 RX ORDER — CETIRIZINE HCL 10 MG
10 TABLET ORAL DAILY
Qty: 90 TAB | Refills: 3 | Status: SHIPPED | OUTPATIENT
Start: 2018-12-14

## 2018-12-14 RX ORDER — AMLODIPINE BESYLATE 2.5 MG/1
2.5 TABLET ORAL DAILY
Qty: 90 TAB | Refills: 1 | Status: SHIPPED | OUTPATIENT
Start: 2018-12-14
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
## 2018-12-14 NOTE — PATIENT INSTRUCTIONS
Learning About High Cholesterol  What is high cholesterol? Cholesterol is a type of fat in your blood. It is needed for many body functions, such as making new cells. Cholesterol is made by your body. It also comes from food you eat. If you have too much cholesterol, it starts to build up in your arteries. This is called hardening of the arteries, or atherosclerosis. High cholesterol raises your risk of a heart attack and stroke. There are different types of cholesterol. LDL is the \"bad\" cholesterol. High LDL can raise your risk for heart disease, heart attack, and stroke. HDL is the \"good\" cholesterol. High HDL is linked with a lower risk for heart disease, heart attack, and stroke. Your cholesterol levels help your doctor find out your risk for having a heart attack or stroke. How can you prevent high cholesterol? A heart-healthy lifestyle can help you prevent high cholesterol. This lifestyle helps lower your risk for a heart attack and stroke. · Eat heart-healthy foods. ? Eat fruits, vegetables, whole grains (like oatmeal), dried beans and peas, nuts and seeds, soy products (like tofu), and fat-free or low-fat dairy products. ? Replace butter, margarine, and hydrogenated or partially hydrogenated oils with olive and canola oils. (Canola oil margarine without trans fat is fine.)  ? Replace red meat with fish, poultry, and soy protein (like tofu). ? Limit processed and packaged foods like chips, crackers, and cookies. · Be active. Exercise can improve your cholesterol level. Get at least 30 minutes of exercise on most days of the week. Walking is a good choice. You also may want to do other activities, such as running, swimming, cycling, or playing tennis or team sports. · Stay at a healthy weight. Lose weight if you need to. · Don't smoke. If you need help quitting, talk to your doctor about stop-smoking programs and medicines. These can increase your chances of quitting for good.   How is high cholesterol treated? The goal of treatment is to reduce your chances of having a heart attack or stroke. The goal is not to lower your cholesterol numbers only. · You may make lifestyle changes, such as eating healthy foods, not smoking, losing weight, and being more active. · You may have to take medicine. Follow-up care is a key part of your treatment and safety. Be sure to make and go to all appointments, and call your doctor if you are having problems. It's also a good idea to know your test results and keep a list of the medicines you take. Where can you learn more? Go to http://court-vito.info/. Enter K319 in the search box to learn more about \"Learning About High Cholesterol. \"  Current as of: December 6, 2017  Content Version: 11.8  © 2399-5857 [employer]. Care instructions adapted under license by [employer] (which disclaims liability or warranty for this information). If you have questions about a medical condition or this instruction, always ask your healthcare professional. [address] any warranty or liability for your use of this information.